# Patient Record
Sex: FEMALE
[De-identification: names, ages, dates, MRNs, and addresses within clinical notes are randomized per-mention and may not be internally consistent; named-entity substitution may affect disease eponyms.]

---

## 2021-09-22 DIAGNOSIS — Z29.9 ENCOUNTER FOR PROPHYLACTIC MEASURES, UNSPECIFIED: ICD-10-CM

## 2021-09-22 PROBLEM — Z00.00 ENCOUNTER FOR PREVENTIVE HEALTH EXAMINATION: Status: ACTIVE | Noted: 2021-09-22

## 2021-09-24 ENCOUNTER — APPOINTMENT (OUTPATIENT)
Dept: SURGERY | Facility: CLINIC | Age: 31
End: 2021-09-24

## 2021-10-15 ENCOUNTER — APPOINTMENT (OUTPATIENT)
Dept: SURGERY | Facility: CLINIC | Age: 31
End: 2021-10-15
Payer: COMMERCIAL

## 2021-10-15 VITALS
DIASTOLIC BLOOD PRESSURE: 82 MMHG | HEIGHT: 62 IN | SYSTOLIC BLOOD PRESSURE: 124 MMHG | OXYGEN SATURATION: 99 % | TEMPERATURE: 98.2 F | HEART RATE: 98 BPM | WEIGHT: 210 LBS | BODY MASS INDEX: 38.64 KG/M2

## 2021-10-15 DIAGNOSIS — E28.2 POLYCYSTIC OVARIAN SYNDROME: ICD-10-CM

## 2021-10-15 PROCEDURE — 99204 OFFICE O/P NEW MOD 45 MIN: CPT

## 2021-10-15 RX ORDER — METFORMIN HYDROCHLORIDE 500 MG/1
500 TABLET, COATED ORAL 3 TIMES DAILY
Refills: 0 | Status: ACTIVE | COMMUNITY

## 2021-10-15 NOTE — ASSESSMENT
[FreeTextEntry1] : 30 y/o female with Class 2 obesity and PCOS here to discuss medical weight loss.\par \par I explained to Natalie that Metformin is the safest medication for PCOS and weight loss while trying to conceive.  We discussed Ozempic as a possibility for weight loss and PCOS treatment, but I explained to her that she would need to use birth control while taking it and stop it for 2 months before trying to conceive because of birth defects risks. we also briefly discussed bariatric surgery, but she is afraid to go that route.  I explained that she would need to wait at least 18 months after weight loss surgery to try to conceive. \par \par Will obtain her recent blood work results. She will think about Ozempic (information sheet given).\par \par She will return after she has made a decision on how she wishes to proceed.\par \par She will see our nutritionist today.

## 2021-10-15 NOTE — REASON FOR VISIT
[Initial Consultation] : an initial consultation for [Obesity] : obesity [Polycystic Ovary Syndrome] : polycystic ovary syndrome dysphagia

## 2021-10-15 NOTE — HISTORY OF PRESENT ILLNESS
[FreeTextEntry1] : Natalie is a very pleasant 30 y/o female with Class 2 obesity and PCOS here to discuss medical weight loss.  She has been trying to conceive for the past few years without success.  Her Metformin was recently increased to 1500mg daily.  She was able to lose 40 pounds a few years ago by limiting carbs and sugar but she was unable to keep the weight off.  She is currently tracking her carb intake and trying the same diet plan but is not losing weight this time.  She is inquiring about other weight loss medications.

## 2021-11-02 ENCOUNTER — NON-APPOINTMENT (OUTPATIENT)
Age: 31
End: 2021-11-02

## 2021-11-03 ENCOUNTER — APPOINTMENT (OUTPATIENT)
Dept: SURGERY | Facility: CLINIC | Age: 31
End: 2021-11-03

## 2022-10-23 ENCOUNTER — NON-APPOINTMENT (OUTPATIENT)
Age: 32
End: 2022-10-23

## 2023-03-31 ENCOUNTER — APPOINTMENT (OUTPATIENT)
Dept: CARDIOLOGY | Facility: CLINIC | Age: 33
End: 2023-03-31

## 2023-04-02 ENCOUNTER — NON-APPOINTMENT (OUTPATIENT)
Age: 33
End: 2023-04-02

## 2024-02-20 DIAGNOSIS — E66.01 MORBID (SEVERE) OBESITY DUE TO EXCESS CALORIES: ICD-10-CM

## 2024-02-21 ENCOUNTER — APPOINTMENT (OUTPATIENT)
Dept: SURGERY | Facility: CLINIC | Age: 34
End: 2024-02-21
Payer: COMMERCIAL

## 2024-02-21 VITALS
TEMPERATURE: 96.8 F | DIASTOLIC BLOOD PRESSURE: 74 MMHG | SYSTOLIC BLOOD PRESSURE: 102 MMHG | HEART RATE: 90 BPM | BODY MASS INDEX: 43.05 KG/M2 | WEIGHT: 228 LBS | OXYGEN SATURATION: 99 % | HEIGHT: 61 IN

## 2024-02-21 PROCEDURE — 99214 OFFICE O/P EST MOD 30 MIN: CPT

## 2024-02-21 PROCEDURE — 99204 OFFICE O/P NEW MOD 45 MIN: CPT

## 2024-02-21 NOTE — REASON FOR VISIT
[Consultation] : a consultation visit [FreeTextEntry1] : Patient here to discuss weight loss surgery and review her medical weight loss program

## 2024-02-21 NOTE — PLAN
[FreeTextEntry1] : We have discussed weight loss surgery options in detail.  We have reviewed the risks and benefits of their options in detail.  Patient is a good candidate for weight loss surgery.  In order to proceed, they will complete the followin. Bloodwork panel including vitamin labs 2. Nutrition consultation 3. Psychology clearance 4. GI consultation for EGD preoperatively  Once above consults are completed, we will meet again to review their procedure choice and schedule surgery.  I have reviewed all surgical options with her and her  in detail.  We discussed the use of weight loss medications as component of the medical weight loss program as well in detail at present time she is leaning toward surgery  Total time for this visit: 45 minutes (chart review, clearance review, patient counseling, documentation preparation)

## 2024-02-21 NOTE — HISTORY OF PRESENT ILLNESS
[de-identified] : Ms. FRACISCO ETIENNE is here to discuss weight loss surgery options.  Patient has had long standing problem trying to manage their weight without longterm success.  In trying to lose weight, they have tried numerous diet and exercise programs.  Patient had both hips replaced in the past year.  She takes metformin for PCOS

## 2024-02-21 NOTE — ASSESSMENT
[FreeTextEntry1] : Patient is an excellent candidate for weight loss surgery.  She needs a tool to help her achieve long-term weight loss control given her recent joint replacements.  She is also interested in starting a family will need to be off any weight loss medication she was taking for an extended period of time which could be due to weight regain.

## 2024-02-21 NOTE — PHYSICAL EXAM
[Alert] : alert [Oriented to Person] : oriented to person [Oriented to Place] : oriented to place [Oriented to Time] : oriented to time [Calm] : calm [de-identified] : Well-appearing female in no acute distress

## 2024-03-11 ENCOUNTER — APPOINTMENT (OUTPATIENT)
Dept: SURGERY | Facility: CLINIC | Age: 34
End: 2024-03-11

## 2024-03-12 ENCOUNTER — APPOINTMENT (OUTPATIENT)
Dept: SURGERY | Facility: CLINIC | Age: 34
End: 2024-03-12

## 2024-11-07 ENCOUNTER — NON-APPOINTMENT (OUTPATIENT)
Age: 34
End: 2024-11-07

## 2025-03-17 ENCOUNTER — OUTPATIENT (OUTPATIENT)
Dept: OUTPATIENT SERVICES | Facility: HOSPITAL | Age: 35
LOS: 1 days | End: 2025-03-17
Payer: COMMERCIAL

## 2025-03-17 DIAGNOSIS — N97.9 FEMALE INFERTILITY, UNSPECIFIED: ICD-10-CM

## 2025-03-17 DIAGNOSIS — Z00.8 ENCOUNTER FOR OTHER GENERAL EXAMINATION: ICD-10-CM

## 2025-03-17 PROCEDURE — 58340 CATHETER FOR HYSTEROGRAPHY: CPT

## 2025-03-17 PROCEDURE — 74740 X-RAY FEMALE GENITAL TRACT: CPT

## 2025-03-18 DIAGNOSIS — N97.9 FEMALE INFERTILITY, UNSPECIFIED: ICD-10-CM
